# Patient Record
Sex: FEMALE | Race: WHITE | NOT HISPANIC OR LATINO
[De-identification: names, ages, dates, MRNs, and addresses within clinical notes are randomized per-mention and may not be internally consistent; named-entity substitution may affect disease eponyms.]

---

## 2018-09-06 ENCOUNTER — APPOINTMENT (OUTPATIENT)
Dept: SPINE | Facility: CLINIC | Age: 78
End: 2018-09-06
Payer: MEDICARE

## 2018-09-06 VITALS
DIASTOLIC BLOOD PRESSURE: 84 MMHG | WEIGHT: 130 LBS | BODY MASS INDEX: 23.92 KG/M2 | HEIGHT: 62 IN | SYSTOLIC BLOOD PRESSURE: 140 MMHG

## 2018-09-06 DIAGNOSIS — Z86.39 PERSONAL HISTORY OF OTHER ENDOCRINE, NUTRITIONAL AND METABOLIC DISEASE: ICD-10-CM

## 2018-09-06 DIAGNOSIS — Z78.9 OTHER SPECIFIED HEALTH STATUS: ICD-10-CM

## 2018-09-06 DIAGNOSIS — S32.000S WEDGE COMPRESSION FRACTURE OF UNSPECIFIED LUMBAR VERTEBRA, SEQUELA: ICD-10-CM

## 2018-09-06 DIAGNOSIS — Z86.79 PERSONAL HISTORY OF OTHER DISEASES OF THE CIRCULATORY SYSTEM: ICD-10-CM

## 2018-09-06 PROCEDURE — 99203 OFFICE O/P NEW LOW 30 MIN: CPT

## 2018-09-06 RX ORDER — ACETAMINOPHEN 325 MG/1
TABLET, FILM COATED ORAL
Refills: 0 | Status: ACTIVE | COMMUNITY

## 2022-07-06 ENCOUNTER — APPOINTMENT (OUTPATIENT)
Dept: ORTHOPEDIC SURGERY | Facility: CLINIC | Age: 82
End: 2022-07-06

## 2022-07-06 VITALS — BODY MASS INDEX: 23.92 KG/M2 | HEIGHT: 62 IN | WEIGHT: 130 LBS

## 2022-07-06 DIAGNOSIS — S42.291A OTHER DISPLACED FRACTURE OF UPPER END OF RIGHT HUMERUS, INITIAL ENCOUNTER FOR CLOSED FRACTURE: ICD-10-CM

## 2022-07-06 DIAGNOSIS — Z00.00 ENCOUNTER FOR GENERAL ADULT MEDICAL EXAMINATION W/OUT ABNORMAL FINDINGS: ICD-10-CM

## 2022-07-06 PROCEDURE — 73030 X-RAY EXAM OF SHOULDER: CPT | Mod: RT

## 2022-07-06 PROCEDURE — 99203 OFFICE O/P NEW LOW 30 MIN: CPT

## 2022-07-06 NOTE — PHYSICAL EXAM
[Right] : right shoulder [] : tenderness at proximal humerus [FreeTextEntry9] : deferred due to fx [de-identified] : deferred

## 2022-07-06 NOTE — ASSESSMENT
[FreeTextEntry1] : XR reviewed\par NWB to RUE\par Sling at all times\par Ice\par FU 1 week with Dr. Redman for XR

## 2022-07-06 NOTE — HISTORY OF PRESENT ILLNESS
[9] : 9 [8] : 8 [de-identified] : RHD female here with right shoulder pain. Had a fall 7/4. Pain to the shoulder. Unable to move the arm. Denies N/T. \par \par PMH: HLD, HTN, Hydrocepalus [FreeTextEntry5] : pt fell on 06/04/2022, pt injured rt shoulder,

## 2022-07-13 ENCOUNTER — APPOINTMENT (OUTPATIENT)
Dept: ORTHOPEDIC SURGERY | Facility: CLINIC | Age: 82
End: 2022-07-13

## 2025-02-10 ENCOUNTER — APPOINTMENT (OUTPATIENT)
Dept: INTERNAL MEDICINE | Facility: CLINIC | Age: 85
End: 2025-02-10